# Patient Record
Sex: FEMALE | Race: BLACK OR AFRICAN AMERICAN | Employment: FULL TIME | URBAN - METROPOLITAN AREA
[De-identification: names, ages, dates, MRNs, and addresses within clinical notes are randomized per-mention and may not be internally consistent; named-entity substitution may affect disease eponyms.]

---

## 2020-06-08 ENCOUNTER — HOSPITAL ENCOUNTER (OUTPATIENT)
Age: 30
Discharge: HOME | End: 2020-06-08
Attending: FAMILY MEDICINE
Payer: COMMERCIAL

## 2020-06-08 ENCOUNTER — APPOINTMENT (OUTPATIENT)
Dept: GENERAL RADIOLOGY | Age: 30
End: 2020-06-08
Attending: FAMILY MEDICINE
Payer: COMMERCIAL

## 2020-06-08 VITALS
WEIGHT: 190 LBS | SYSTOLIC BLOOD PRESSURE: 112 MMHG | DIASTOLIC BLOOD PRESSURE: 79 MMHG | RESPIRATION RATE: 16 BRPM | OXYGEN SATURATION: 100 % | TEMPERATURE: 98 F | HEART RATE: 76 BPM

## 2020-06-08 DIAGNOSIS — S70.352A FOREIGN BODY OF LEFT THIGH, INITIAL ENCOUNTER: Primary | ICD-10-CM

## 2020-06-08 PROCEDURE — 99203 OFFICE O/P NEW LOW 30 MIN: CPT

## 2020-06-08 PROCEDURE — 73552 X-RAY EXAM OF FEMUR 2/>: CPT | Performed by: FAMILY MEDICINE

## 2020-06-08 RX ORDER — CEPHALEXIN 500 MG/1
500 CAPSULE ORAL 3 TIMES DAILY
Qty: 21 CAPSULE | Refills: 0 | Status: SHIPPED | OUTPATIENT
Start: 2020-06-08 | End: 2020-06-15

## 2020-06-08 NOTE — ED INITIAL ASSESSMENT (HPI)
Patient states that lat Tuesday or Wednesday while in a crowd fell onto a piece of broken glass to the right upper leg. Patient denies pustular drainage. Patient states that her last Tetanus Immunization was 2 years ago.

## 2020-06-08 NOTE — ED PROVIDER NOTES
Patient Seen in: [facility]      History   Patient presents with:  FB in Skin    Stated Complaint: glass in foot, x2days     HPI    21-year-old female presents the IC secondary to foreign body to the left thigh.   Patient was pro thigh.  2-3 larger lacerations measuring roughly 2 to 3 cm in length and width with a small amount of white discharge. Psych: Normal affect.     ED Course   Labs Reviewed - No data to display       PROCEDURE:  XR FEMUR MIN 2 VIEWS LEFT (CPT=73552)     TECH Impression:  Foreign body of left thigh, initial encounter  (primary encounter diagnosis)    Disposition:  Discharge  6/8/2020 12:28 pm    Follow-up:  Rafael Hermosillo MD  [address]    Go to

## 2020-06-18 ENCOUNTER — HOSPITAL ENCOUNTER (OUTPATIENT)
Age: 30
Discharge: HOME | End: 2020-06-18
Attending: FAMILY MEDICINE
Payer: COMMERCIAL

## 2020-06-18 VITALS
SYSTOLIC BLOOD PRESSURE: 120 MMHG | RESPIRATION RATE: 16 BRPM | DIASTOLIC BLOOD PRESSURE: 64 MMHG | HEART RATE: 97 BPM | OXYGEN SATURATION: 98 % | TEMPERATURE: 98 F

## 2020-06-18 DIAGNOSIS — N94.6 DYSMENORRHEA: Primary | ICD-10-CM

## 2020-06-18 PROCEDURE — 81025 URINE PREGNANCY TEST: CPT | Performed by: FAMILY MEDICINE

## 2020-06-18 PROCEDURE — 99212 OFFICE O/P EST SF 10 MIN: CPT

## 2020-06-18 PROCEDURE — 81002 URINALYSIS NONAUTO W/O SCOPE: CPT | Performed by: FAMILY MEDICINE

## 2020-06-18 NOTE — ED PROVIDER NOTES
Patient Seen in: [facility]      History   Patient presents with:  Abdominal Pain    Stated Complaint: abdominal pain    HPI    23-year-old female presents to [address] [place] [address] secondary to abdominal pain.   Patient developed cramping to the lo signs.  LAD: No lymphadenopathy. Abd: +BS. soft. NT. No rebound. No guarding. Neg Cabrera's, Neg McBurney's. No mass. No CVAT to percussion Scott  Neuro: A&O x3. No focal deficits. Vascular: Peripheral pulses intact. No edema  Skin: Intact.  No lesions no

## 2020-06-18 NOTE — ED INITIAL ASSESSMENT (HPI)
C/o lower abdominal cramping started this morning around 0730AM. Cramping subsided after menstrual cycle started. Denies nausea/vomiting/diarhhea. Denies urinary symptoms.

## 2020-07-28 ENCOUNTER — APPOINTMENT (OUTPATIENT)
Dept: CT IMAGING | Age: 30
End: 2020-07-28
Attending: NURSE PRACTITIONER
Payer: COMMERCIAL

## 2020-07-28 ENCOUNTER — HOSPITAL ENCOUNTER (OUTPATIENT)
Age: 30
Discharge: HOME | End: 2020-07-28
Attending: EMERGENCY MEDICINE
Payer: COMMERCIAL

## 2020-07-28 VITALS
TEMPERATURE: 98 F | SYSTOLIC BLOOD PRESSURE: 107 MMHG | OXYGEN SATURATION: 98 % | HEART RATE: 82 BPM | DIASTOLIC BLOOD PRESSURE: 64 MMHG | RESPIRATION RATE: 16 BRPM

## 2020-07-28 DIAGNOSIS — N91.5 OLIGOMENORRHEA, UNSPECIFIED TYPE: ICD-10-CM

## 2020-07-28 DIAGNOSIS — E87.6 HYPOKALEMIA: ICD-10-CM

## 2020-07-28 DIAGNOSIS — R10.9 ABDOMINAL PAIN, ACUTE: Primary | ICD-10-CM

## 2020-07-28 LAB
#MXD IC: 0.8 X10ˆ3/UL (ref 0.1–1)
CREAT BLD-MCNC: 0.7 MG/DL (ref 0.55–1.02)
GLUCOSE BLD-MCNC: 89 MG/DL (ref 70–99)
HCT VFR BLD AUTO: 36.9 % (ref 35–48)
HGB BLD-MCNC: 11.8 G/DL (ref 12–16)
ISTAT BUN: 6 MG/DL (ref 8–20)
ISTAT CHLORIDE: 101 MMOL/L (ref 101–111)
ISTAT HEMATOCRIT: 38 % (ref 34–50)
ISTAT IONIZED CALCIUM FOR CHEM 8: 1.2 MMOL/L (ref 1.12–1.32)
ISTAT POTASSIUM: 3.3 MMOL/L (ref 3.6–5.1)
ISTAT SODIUM: 139 MMOL/L (ref 136–145)
ISTAT TCO2: 25 MMOL/L (ref 22–32)
LYMPHOCYTES # BLD AUTO: 2 X10ˆ3/UL (ref 1–4)
LYMPHOCYTES NFR BLD AUTO: 19.5 %
MCH RBC QN AUTO: 27.8 PG (ref 26–34)
MCHC RBC AUTO-ENTMCNC: 32 G/DL (ref 31–37)
MCV RBC AUTO: 87 FL (ref 80–100)
MIXED CELL %: 7.4 %
NEUTROPHILS # BLD AUTO: 7.5 X10ˆ3/UL (ref 1.5–7.7)
NEUTROPHILS NFR BLD AUTO: 73.1 %
PLATELET # BLD AUTO: 316 X10ˆ3/UL (ref 150–450)
POCT BILIRUBIN URINE: NEGATIVE
POCT BLOOD URINE: NEGATIVE
POCT GLUCOSE URINE: NEGATIVE MG/DL
POCT KETONE URINE: NEGATIVE MG/DL
POCT LEUKOCYTE ESTERASE URINE: NEGATIVE
POCT NITRITE URINE: NEGATIVE
POCT PH URINE: 7 (ref 5–8)
POCT PROTEIN URINE: NEGATIVE MG/DL
POCT SPECIFIC GRAVITY URINE: 1.01
POCT URINE CLARITY: CLEAR
POCT URINE COLOR: YELLOW
POCT URINE PREGNANCY: NEGATIVE
POCT UROBILINOGEN URINE: 0.2 MG/DL
RBC # BLD AUTO: 4.24 X10ˆ6/UL (ref 3.8–5.3)
WBC # BLD AUTO: 10.3 X10ˆ3/UL (ref 4–11)

## 2020-07-28 PROCEDURE — 96374 THER/PROPH/DIAG INJ IV PUSH: CPT

## 2020-07-28 PROCEDURE — 74177 CT ABD & PELVIS W/CONTRAST: CPT | Performed by: NURSE PRACTITIONER

## 2020-07-28 PROCEDURE — 81002 URINALYSIS NONAUTO W/O SCOPE: CPT | Performed by: NURSE PRACTITIONER

## 2020-07-28 PROCEDURE — 85025 COMPLETE CBC W/AUTO DIFF WBC: CPT | Performed by: NURSE PRACTITIONER

## 2020-07-28 PROCEDURE — 99214 OFFICE O/P EST MOD 30 MIN: CPT

## 2020-07-28 PROCEDURE — 80047 BASIC METABLC PNL IONIZED CA: CPT

## 2020-07-28 PROCEDURE — 99215 OFFICE O/P EST HI 40 MIN: CPT

## 2020-07-28 PROCEDURE — 81025 URINE PREGNANCY TEST: CPT | Performed by: NURSE PRACTITIONER

## 2020-07-28 RX ORDER — KETOROLAC TROMETHAMINE 30 MG/ML
15 INJECTION, SOLUTION INTRAMUSCULAR; INTRAVENOUS ONCE
Status: COMPLETED | OUTPATIENT
Start: 2020-07-28 | End: 2020-07-28

## 2020-07-28 RX ORDER — POTASSIUM CHLORIDE 20 MEQ/1
40 TABLET, EXTENDED RELEASE ORAL ONCE
Status: COMPLETED | OUTPATIENT
Start: 2020-07-28 | End: 2020-07-28

## 2020-07-28 NOTE — ED INITIAL ASSESSMENT (HPI)
Pt states typical menstrual cycle Monday to Monday. Vaginal bleeding returned Wednesday and Thursday. Spotting Friday  States having right abdominal cramping, not typical with her menstrual cycle.   Denies nausea, diarrhea

## 2020-07-28 NOTE — ED PROVIDER NOTES
Patient Seen in: [facility]      History   Patient presents with:  Abdominal Pain    Stated Complaint: abdominal pain, x5days     HPI  Patient is 21-year-old female without significant medical history presents with abdominal kendell Temp 98.2 °F (36.8 °C)   Temp src Temporal   SpO2 98 %   O2 Device None (Room air)       Current:/84   Pulse 102   Temp 98.2 °F (36.8 °C) (Temporal)   Resp 20   LMP 07/13/2020 (Exact Date)   SpO2 98%         Physical Exam  Vitals signs and nursing components:    ISTAT BUN 6 (*)     ISTAT Potassium 3.3 (*)     All other components within normal limits   POCT URINALYSIS DIPSTICK - Normal   POCT PREGNANCY, URINE - Normal            Ct Abdomen+pelvis(contrast Only)(cpt=74177)    Result Date: 7/28/2020 Subcutaneous edema, correlate clinically. Dictated by (CST): Glendy Bailey MD on 7/28/2020 at 11:55 AM     Finalized by (CST): Glendy Bailey MD on 7/28/2020 at 11:59 AM             MDM         IV line was started and labs were obtained.   CBC-unremarkable  I-ST

## 2020-08-12 ENCOUNTER — HOSPITAL ENCOUNTER (OUTPATIENT)
Age: 30
Discharge: HOME | End: 2020-08-12
Attending: EMERGENCY MEDICINE
Payer: COMMERCIAL

## 2020-08-12 VITALS
DIASTOLIC BLOOD PRESSURE: 76 MMHG | TEMPERATURE: 98 F | RESPIRATION RATE: 16 BRPM | HEART RATE: 83 BPM | WEIGHT: 185 LBS | OXYGEN SATURATION: 98 % | SYSTOLIC BLOOD PRESSURE: 115 MMHG

## 2020-08-12 DIAGNOSIS — J02.9 VIRAL PHARYNGITIS: Primary | ICD-10-CM

## 2020-08-12 LAB — POCT RAPID STREP: NEGATIVE

## 2020-08-12 PROCEDURE — 87081 CULTURE SCREEN ONLY: CPT | Performed by: EMERGENCY MEDICINE

## 2020-08-12 PROCEDURE — 99213 OFFICE O/P EST LOW 20 MIN: CPT

## 2020-08-12 PROCEDURE — 87430 STREP A AG IA: CPT | Performed by: EMERGENCY MEDICINE

## 2020-08-12 PROCEDURE — 99214 OFFICE O/P EST MOD 30 MIN: CPT

## 2020-08-12 NOTE — ED PROVIDER NOTES
Patient Seen in: [facility] [address] [place] [address],[address]      History   Patient presents with:  Sore Throat    Stated Complaint: 2 days sore throat    HPI    Patient is a 21-year-old female comes in emergency room for evaluation of a sore throat.   Symptoms dry  NEURO:  no focal deficits    ED Course     Labs Reviewed   POCT RAPID STREP - Normal   GRP A STREP CULT, THROAT                  MDM     Patient is a 26-year-old female comes to emergency room for evaluation of sore throat. Strep negative.   Symptoms

## 2021-02-15 ENCOUNTER — TELEPHONE (OUTPATIENT)
Dept: SCHEDULING | Age: 31
End: 2021-02-15

## 2022-11-11 ENCOUNTER — HOSPITAL ENCOUNTER (OUTPATIENT)
Age: 32
Discharge: HOME | End: 2022-11-11
Payer: COMMERCIAL

## 2022-11-11 VITALS
SYSTOLIC BLOOD PRESSURE: 124 MMHG | DIASTOLIC BLOOD PRESSURE: 74 MMHG | BODY MASS INDEX: 33.11 KG/M2 | RESPIRATION RATE: 18 BRPM | WEIGHT: 206 LBS | OXYGEN SATURATION: 99 % | TEMPERATURE: 98 F | HEART RATE: 86 BPM | HEIGHT: 66 IN

## 2022-11-11 DIAGNOSIS — J02.9 SORE THROAT: ICD-10-CM

## 2022-11-11 DIAGNOSIS — R09.82 POSTNASAL DRIP: Primary | ICD-10-CM

## 2022-11-11 LAB
S PYO AG THROAT QL: NEGATIVE
SARS-COV-2 RNA RESP QL NAA+PROBE: NOT DETECTED

## 2022-11-11 PROCEDURE — 99214 OFFICE O/P EST MOD 30 MIN: CPT

## 2022-11-11 PROCEDURE — 87880 STREP A ASSAY W/OPTIC: CPT

## 2022-11-11 PROCEDURE — 99213 OFFICE O/P EST LOW 20 MIN: CPT

## 2022-11-11 NOTE — DISCHARGE INSTRUCTIONS
Increase water intake 2 to 3 L a day to help keep secretions in a liquid state to lessen severity of sore throat. Repeat COVID test in 2 to 3 days to ensure today's exam was not a false negative.

## (undated) NOTE — LETTER
Date & Time: 6/18/2020, 10:28 AM  Patient: Abby Menendez  Encounter Provider(s):    Jimbo Garcia MD       To Whom It May Concern:    Quynh Chakraborty was seen and treated in our department on 6/18/2020. She can return to work 6/19/2020.

## (undated) NOTE — LETTER
Date & Time: 11/11/2022, 3:32 PM  Patient: Jessy Mayberry  Encounter Provider(s):    LAURA Norris       To Whom It May Concern:    Tori Padgett was seen and treated in our department on 11/11/2022. She can return to work 11/12/22.     If you have any questions or concerns, please do not hesitate to call.        _____________________________  Physician/AP[facility] Signature

## (undated) NOTE — LETTER
Date & Time: 6/8/2020, 12:28 PM  Patient: Miesha Valles  Encounter Provider(s):    Jamison Ruiz MD       To Whom It May Concern:    Martín Mata was seen and treated in our department on 6/8/2020.  She should not return to work until 6/9